# Patient Record
Sex: FEMALE | Race: BLACK OR AFRICAN AMERICAN | NOT HISPANIC OR LATINO | URBAN - METROPOLITAN AREA
[De-identification: names, ages, dates, MRNs, and addresses within clinical notes are randomized per-mention and may not be internally consistent; named-entity substitution may affect disease eponyms.]

---

## 2017-09-30 ENCOUNTER — EMERGENCY (EMERGENCY)
Facility: HOSPITAL | Age: 47
LOS: 1 days | Discharge: HOME | End: 2017-09-30
Attending: EMERGENCY MEDICINE | Admitting: EMERGENCY MEDICINE
Payer: COMMERCIAL

## 2017-09-30 VITALS
DIASTOLIC BLOOD PRESSURE: 90 MMHG | HEART RATE: 67 BPM | SYSTOLIC BLOOD PRESSURE: 140 MMHG | TEMPERATURE: 98 F | OXYGEN SATURATION: 100 % | RESPIRATION RATE: 16 BRPM

## 2017-09-30 VITALS
TEMPERATURE: 100 F | HEART RATE: 84 BPM | OXYGEN SATURATION: 100 % | DIASTOLIC BLOOD PRESSURE: 66 MMHG | SYSTOLIC BLOOD PRESSURE: 121 MMHG | RESPIRATION RATE: 17 BRPM

## 2017-09-30 LAB
ALBUMIN SERPL ELPH-MCNC: 4.6 G/DL — SIGNIFICANT CHANGE UP (ref 3.3–5)
ALP SERPL-CCNC: 58 U/L — SIGNIFICANT CHANGE UP (ref 40–120)
ALT FLD-CCNC: 17 U/L — SIGNIFICANT CHANGE UP (ref 4–33)
AST SERPL-CCNC: 18 U/L — SIGNIFICANT CHANGE UP (ref 4–32)
BASOPHILS # BLD AUTO: 0.02 K/UL — SIGNIFICANT CHANGE UP (ref 0–0.2)
BASOPHILS NFR BLD AUTO: 0.2 % — SIGNIFICANT CHANGE UP (ref 0–2)
BILIRUB SERPL-MCNC: 0.2 MG/DL — SIGNIFICANT CHANGE UP (ref 0.2–1.2)
BUN SERPL-MCNC: 13 MG/DL — SIGNIFICANT CHANGE UP (ref 7–23)
CALCIUM SERPL-MCNC: 9.2 MG/DL — SIGNIFICANT CHANGE UP (ref 8.4–10.5)
CHLORIDE SERPL-SCNC: 102 MMOL/L — SIGNIFICANT CHANGE UP (ref 98–107)
CO2 SERPL-SCNC: 26 MMOL/L — SIGNIFICANT CHANGE UP (ref 22–31)
CREAT SERPL-MCNC: 0.75 MG/DL — SIGNIFICANT CHANGE UP (ref 0.5–1.3)
EOSINOPHIL # BLD AUTO: 0.02 K/UL — SIGNIFICANT CHANGE UP (ref 0–0.5)
EOSINOPHIL NFR BLD AUTO: 0.2 % — SIGNIFICANT CHANGE UP (ref 0–6)
GLUCOSE SERPL-MCNC: 104 MG/DL — HIGH (ref 70–99)
HCT VFR BLD CALC: 35.9 % — SIGNIFICANT CHANGE UP (ref 34.5–45)
HGB BLD-MCNC: 11.3 G/DL — LOW (ref 11.5–15.5)
IMM GRANULOCYTES # BLD AUTO: 0.03 # — SIGNIFICANT CHANGE UP
IMM GRANULOCYTES NFR BLD AUTO: 0.4 % — SIGNIFICANT CHANGE UP (ref 0–1.5)
LYMPHOCYTES # BLD AUTO: 0.91 K/UL — LOW (ref 1–3.3)
LYMPHOCYTES # BLD AUTO: 11.1 % — LOW (ref 13–44)
MAGNESIUM SERPL-MCNC: 1.8 MG/DL — SIGNIFICANT CHANGE UP (ref 1.6–2.6)
MCHC RBC-ENTMCNC: 26.8 PG — LOW (ref 27–34)
MCHC RBC-ENTMCNC: 31.5 % — LOW (ref 32–36)
MCV RBC AUTO: 85.3 FL — SIGNIFICANT CHANGE UP (ref 80–100)
MONOCYTES # BLD AUTO: 0.54 K/UL — SIGNIFICANT CHANGE UP (ref 0–0.9)
MONOCYTES NFR BLD AUTO: 6.6 % — SIGNIFICANT CHANGE UP (ref 2–14)
NEUTROPHILS # BLD AUTO: 6.69 K/UL — SIGNIFICANT CHANGE UP (ref 1.8–7.4)
NEUTROPHILS NFR BLD AUTO: 81.5 % — HIGH (ref 43–77)
NRBC # FLD: 0 — SIGNIFICANT CHANGE UP
PHOSPHATE SERPL-MCNC: 4.2 MG/DL — SIGNIFICANT CHANGE UP (ref 2.5–4.5)
PLATELET # BLD AUTO: 226 K/UL — SIGNIFICANT CHANGE UP (ref 150–400)
PMV BLD: 10.2 FL — SIGNIFICANT CHANGE UP (ref 7–13)
POTASSIUM SERPL-MCNC: 3.9 MMOL/L — SIGNIFICANT CHANGE UP (ref 3.5–5.3)
POTASSIUM SERPL-SCNC: 3.9 MMOL/L — SIGNIFICANT CHANGE UP (ref 3.5–5.3)
PROT SERPL-MCNC: 7.6 G/DL — SIGNIFICANT CHANGE UP (ref 6–8.3)
RBC # BLD: 4.21 M/UL — SIGNIFICANT CHANGE UP (ref 3.8–5.2)
RBC # FLD: 15 % — HIGH (ref 10.3–14.5)
SODIUM SERPL-SCNC: 142 MMOL/L — SIGNIFICANT CHANGE UP (ref 135–145)
TSH SERPL-MCNC: 0.71 UIU/ML — SIGNIFICANT CHANGE UP (ref 0.27–4.2)
WBC # BLD: 8.21 K/UL — SIGNIFICANT CHANGE UP (ref 3.8–10.5)
WBC # FLD AUTO: 8.21 K/UL — SIGNIFICANT CHANGE UP (ref 3.8–10.5)

## 2017-09-30 PROCEDURE — 99284 EMERGENCY DEPT VISIT MOD MDM: CPT | Mod: 25

## 2017-09-30 PROCEDURE — 93010 ELECTROCARDIOGRAM REPORT: CPT

## 2017-09-30 NOTE — ED PROVIDER NOTE - OBJECTIVE STATEMENT
46 y/o female PMH of HTN p/w palpitations. Per patient, woke up warm and had to go to bathroom - felt her heart start racing - lasted a few minutes. Denies any CP, SOB, N/V diaphoresis during this episode. Denies any EtOH or drug use. Never had an episode like this in the past. No new meds. Per EMS, HTN to 200/100 in EMS that resolved. Initially tachy to 117. Currently appears well - denies any symptoms including f/c, CP, SOB, N/V/D, numbness, paresthesias. Does not have a cardiologist.

## 2017-09-30 NOTE — ED PROVIDER NOTE - ATTENDING CONTRIBUTION TO CARE
DR. BOJORQUEZ, ATTENDING MD-  I performed a face to face bedside interview with patient regarding history of present illness, review of symptoms and past medical history. I completed an independent physical exam.  I have discussed patient's plan of care with the resident.   Documentation as above in the note.    48 y/o female with c/o palpitations DR. BOJORQUEZ, ATTENDING MD-  I performed a face to face bedside interview with patient regarding history of present illness, review of symptoms and past medical history. I completed an independent physical exam.  I have discussed patient's plan of care with the resident.   Documentation as above in the note.    46 y/o female bibems with c/o palpitations.  Woke up to go to restroom, while urinating felt palpitations.  Denies f/c, syncope, ha, neck stiffness, cp, sob, cough, abd pain, n/v/d, dysuria, rash.  Afebrile, vs wnl, nad, ctabil, s1s2 rrr no m/r/g, abd soft non ttp no r/g, no cva tenderness b/l, no leg swelling b/l, no rash.  Anemia vs dehydration vs hyperthyroid vs pregnancy.  Obtain ekg, cbc, bmp, upreg, tsh, give ivf, reassess.

## 2017-09-30 NOTE — ED PROVIDER NOTE - PLAN OF CARE
1) Please return to the ED should you have any new or worsening symptoms, worsening pain, develop chest pain, palpitations, shortness of breath, or any concerning symptoms  2) Please follow up with cardiology in 2-3 days. Please call [phone] to make an appointment.

## 2017-09-30 NOTE — ED PROVIDER NOTE - CARE PLAN
Principal Discharge DX:	Palpitations  Instructions for follow-up, activity and diet:	1) Please return to the ED should you have any new or worsening symptoms, worsening pain, develop chest pain, palpitations, shortness of breath, or any concerning symptoms  2) Please follow up with cardiology in 2-3 days. Please call [phone] to make an appointment.

## 2017-09-30 NOTE — ED ADULT NURSE NOTE - OBJECTIVE STATEMENT
The patient is a 48 y/o female a&ox4 presenting with a c/c of palpitations.  As per patient she woke up feeling warm and began to feel her heart racing.  Patient reported this episode lasted several minutes in duration.  Denies N/V/D, fevers/chills, SOB, CP during the episode.  At present patient denies all symptoms, denies palpitations.  Patient denies pain and discomfort, respirations even and unlabored, in nad, MD JOSE presently evaluating.

## 2017-09-30 NOTE — ED ADULT TRIAGE NOTE - CHIEF COMPLAINT QUOTE
Pt. brought in by EMS from home c/o "heart racing" and "not feeling well"; on arrival EMS states pt. was tachycardic (117bpm) and hypertensive (202/100) which decreased to 146/90 ten minutes later. Per patient she was lying down when she began to feel "hot" and palpitations. Denies palpitations, chest pain or SOB at present time. Admits only to nausea att. Respirations even, unlabored, appears comfortable in NAD.

## 2017-09-30 NOTE — ED PROVIDER NOTE - MEDICAL DECISION MAKING DETAILS
Jerad Resident: 48 y/o female with HTN on beta blockade p/w palpitations - initially HTn to 200/100 per EMS with Tachy to 117 - has resolved - currently feels well - no associated lightheadedness, SOB, or CP - will check lytes, pregnancy, TSH, and keep on telemonitoring - d/c if labs WNL and pregnancy negative, d/c with cards follow up

## 2017-11-03 ENCOUNTER — EMERGENCY (EMERGENCY)
Facility: HOSPITAL | Age: 47
LOS: 1 days | Discharge: HOME | End: 2017-11-03
Attending: EMERGENCY MEDICINE | Admitting: EMERGENCY MEDICINE
Payer: COMMERCIAL

## 2017-11-03 VITALS
SYSTOLIC BLOOD PRESSURE: 121 MMHG | OXYGEN SATURATION: 99 % | HEART RATE: 82 BPM | TEMPERATURE: 98 F | RESPIRATION RATE: 15 BRPM | DIASTOLIC BLOOD PRESSURE: 65 MMHG

## 2017-11-03 LAB
HCG UR-SCNC: NEGATIVE — SIGNIFICANT CHANGE UP
SP GR UR: 1.01 — SIGNIFICANT CHANGE UP (ref 1–1.03)

## 2017-11-03 PROCEDURE — 99284 EMERGENCY DEPT VISIT MOD MDM: CPT | Mod: 25

## 2017-11-03 NOTE — ED PROVIDER NOTE - CONSTITUTIONAL, MLM
Recommended OBSERVATION and continued MONITORING for progression. normal... Well appearing, well nourished, awake, alert, oriented to person, place, time/situation and in no apparent distress.

## 2017-11-03 NOTE — ED PROVIDER NOTE - PLAN OF CARE
You were seen today for your palpitations.  It is unclear what the cause is at this time.  Avoid caffeine, alcohol, and stay well hydrated.  Be sure to follow up with your primary care physician in 2-3 days for re-evaluation.  RETURN TO THE EMERGENCY DEPARTMENT IMMEDIATELY IF YOU DEVELOP CHEST PAIN, TROUBLE BREATHING, IF YOU PASS OUT, OR FOR ANY OTHER CONCERN.

## 2017-11-03 NOTE — ED ADULT TRIAGE NOTE - CHIEF COMPLAINT QUOTE
Pt c/o palpitations and headache while laying in bed this evening.  Pt also endorses left arm tingling.  Steady gait noted; pt appears comfortable.

## 2017-11-03 NOTE — ED ADULT NURSE NOTE - OBJECTIVE STATEMENT
pt arrives w/ c/o right sided breast pain s/p mammogram in August. pt states since the procedure has been having on going pain. pt appears comfortable and in NAD. waiting further orders will continue to monitor.

## 2017-11-03 NOTE — ED PROVIDER NOTE - MEDICAL DECISION MAKING DETAILS
46 y/o female with c/o palpitations.  EKG wnl, nsr, no st elevations or depressions, no t wave inversions, no prolonged qtc, no heart block, no delta or epsilon waves, no lvh.  Asymptomatic at this time, vss, no signs of anemia, has cards appt set up, likely would benefit most from holter monitor.  Recent labwork for same complaint September 30th showed no anemia or thyroid dysfunction.  Discussed with patient, shared decision making:  no labs today as unlikely to be of benefit.  She agrees to call cards tomorrow to try to move up her appt date, will see her pcp at next available appt, to return if worse.  Discharge home.

## 2020-06-26 NOTE — ED PROVIDER NOTE - NS ED MD DISPO DISCHARGE
Home
No. KRISTINE screening performed.  STOP BANG Legend: 0-2 = LOW Risk; 3-4 = INTERMEDIATE Risk; 5-8 = HIGH Risk

## 2022-10-17 NOTE — ED PROVIDER NOTE - CARE PLAN
Chief Complaint   Patient presents with   • Back Pain     Pt presents to ED triage via private vehicle with reports of right side back pain with radiation to abd when standing onset 1 hour.    Endorses back pain  Denies nausea, vomiting, hematuria, dysuria, injury/trauma, SOB, cough  +ambulatory   Vital signs:    Temp: 98 °F (36.7 °C) Temp src: Oral  Heart Rate: (!) 104  Heart Rate Source: Monitor  BP: 119/76     Resp: 17  SpO2: 96 % (10/16/22 1918)     Principal Discharge DX:	Palpitations Principal Discharge DX:	Palpitations  Instructions for follow-up, activity and diet:	You were seen today for your palpitations.  It is unclear what the cause is at this time.  Avoid caffeine, alcohol, and stay well hydrated.  Be sure to follow up with your primary care physician in 2-3 days for re-evaluation.  RETURN TO THE EMERGENCY DEPARTMENT IMMEDIATELY IF YOU DEVELOP CHEST PAIN, TROUBLE BREATHING, IF YOU PASS OUT, OR FOR ANY OTHER CONCERN.